# Patient Record
Sex: FEMALE | Race: WHITE | NOT HISPANIC OR LATINO | URBAN - METROPOLITAN AREA
[De-identification: names, ages, dates, MRNs, and addresses within clinical notes are randomized per-mention and may not be internally consistent; named-entity substitution may affect disease eponyms.]

---

## 2017-05-23 ENCOUNTER — INPATIENT HOSPITAL (OUTPATIENT)
Dept: URBAN - METROPOLITAN AREA HOSPITAL 32 | Facility: HOSPITAL | Age: 37
End: 2017-05-23

## 2017-05-23 DIAGNOSIS — K80.21 CALCULUS OF GALLBLADDER WITHOUT CHOLECYSTITIS WITH OBSTRUCTI: ICD-10-CM

## 2017-05-23 DIAGNOSIS — R74.8 ABNORMAL LEVELS OF OTHER SERUM ENZYMES: ICD-10-CM

## 2017-05-23 PROCEDURE — 43260 ERCP W/SPECIMEN COLLECTION: CPT

## 2017-05-23 PROCEDURE — 99254 IP/OBS CNSLTJ NEW/EST MOD 60: CPT | Mod: 25

## 2021-04-14 ENCOUNTER — OFFICE (OUTPATIENT)
Dept: URBAN - METROPOLITAN AREA CLINIC 102 | Facility: CLINIC | Age: 41
End: 2021-04-14

## 2021-04-14 VITALS
HEIGHT: 59 IN | DIASTOLIC BLOOD PRESSURE: 92 MMHG | HEART RATE: 101 BPM | TEMPERATURE: 97.1 F | WEIGHT: 222 LBS | SYSTOLIC BLOOD PRESSURE: 150 MMHG

## 2021-04-14 DIAGNOSIS — E66.01 MORBID (SEVERE) OBESITY DUE TO EXCESS CALORIES: ICD-10-CM

## 2021-04-14 DIAGNOSIS — R14.0 ABDOMINAL DISTENSION (GASEOUS): ICD-10-CM

## 2021-04-14 DIAGNOSIS — R74.8 ABNORMAL LEVELS OF OTHER SERUM ENZYMES: ICD-10-CM

## 2021-04-14 DIAGNOSIS — Z79.1 LONG TERM (CURRENT) USE OF NON-STEROIDAL ANTI-INFLAMMATORIES: ICD-10-CM

## 2021-04-14 DIAGNOSIS — K21.9 GASTRO-ESOPHAGEAL REFLUX DISEASE WITHOUT ESOPHAGITIS: ICD-10-CM

## 2021-04-14 PROCEDURE — 99245 OFF/OP CONSLTJ NEW/EST HI 55: CPT

## 2021-04-14 NOTE — SERVICEHPINOTES
GRACIELA GRAY   is a   40   year old    female who is being seen in consultation at the request of   PETE ANN   for elevated lfts. She reports having a history of elevated LFTs for about a year now. Lab results available from January only, states these were the most recent labs.BR1/5/21 AST-50, ALT-126, alk phos-130, normal bili,, cholesterol-333, TG-509, glucose 122, plt-245, A1c 7.5%, GGT high at 478, normal lipase. BRDenies family hx of liver disease or cirrhosis. Drinks alcohol very rarely, states she has had approx. 3 drinks within the past 4 months. Takes milk thistle, just started this, vitamin B complex, denies any other OTC meds or herbal supplements. States she had an US in January/Feb states showed fatty liver. Viral hepatitis testing was neg per her report as well. She has gained over 100lbs over the past couple years. Previously on lamictal and Ativan but stopped each 3 years ago. She had CCY approx. 3 years ago due to gallstones, required ERCP due to choledocholithiasis prior to surgery per her report. She has had diarrhea since intermittently. She takes wellchol 625mg--3 pills BID which does help. Often forgets second dose due to her schedule (she is a nurse working night shift). Also on metformin for both PCOS and diabetes. Reports abd bloating often. She has back and knee pain and takes NSAIDs daily. Takes 3-325mg ASA per day, states Tylenol and other NSAIDs do not work for her. She does have heartburn but has been taking pantoprazole 40mg daily for past month or so, usually takes whenever she remembers. No n/v or dysphagia. Had colonoscopy approx. 15 years ago, states she had diverticulosis. Denies rectal bleeding or melena. BR